# Patient Record
Sex: MALE | Race: WHITE | NOT HISPANIC OR LATINO | URBAN - METROPOLITAN AREA
[De-identification: names, ages, dates, MRNs, and addresses within clinical notes are randomized per-mention and may not be internally consistent; named-entity substitution may affect disease eponyms.]

---

## 2018-01-12 ENCOUNTER — OUTPATIENT (OUTPATIENT)
Dept: OUTPATIENT SERVICES | Facility: HOSPITAL | Age: 73
LOS: 1 days | Discharge: HOME | End: 2018-01-12

## 2018-01-12 DIAGNOSIS — D47.2 MONOCLONAL GAMMOPATHY: ICD-10-CM

## 2018-01-17 ENCOUNTER — RESULT REVIEW (OUTPATIENT)
Age: 73
End: 2018-01-17

## 2018-01-17 ENCOUNTER — OUTPATIENT (OUTPATIENT)
Dept: OUTPATIENT SERVICES | Facility: HOSPITAL | Age: 73
LOS: 1 days | End: 2018-01-17
Payer: MEDICARE

## 2018-01-17 ENCOUNTER — APPOINTMENT (OUTPATIENT)
Dept: HEMATOLOGY ONCOLOGY | Facility: CLINIC | Age: 73
End: 2018-01-17
Payer: MEDICARE

## 2018-01-17 VITALS
RESPIRATION RATE: 16 BRPM | TEMPERATURE: 98.2 F | BODY MASS INDEX: 24.02 KG/M2 | HEART RATE: 62 BPM | OXYGEN SATURATION: 98 % | DIASTOLIC BLOOD PRESSURE: 80 MMHG | HEIGHT: 69.37 IN | WEIGHT: 164 LBS | SYSTOLIC BLOOD PRESSURE: 122 MMHG

## 2018-01-17 DIAGNOSIS — D47.2 MONOCLONAL GAMMOPATHY: ICD-10-CM

## 2018-01-17 LAB
BASOPHILS # BLD AUTO: 0 K/UL — SIGNIFICANT CHANGE UP (ref 0–0.2)
BASOPHILS NFR BLD AUTO: 0.6 % — SIGNIFICANT CHANGE UP (ref 0–2)
BLD GP AB SCN SERPL QL: NEGATIVE — SIGNIFICANT CHANGE UP
DAT POLY-SP REAG RBC QL: NEGATIVE — SIGNIFICANT CHANGE UP
EOSINOPHIL # BLD AUTO: 0.3 K/UL — SIGNIFICANT CHANGE UP (ref 0–0.5)
EOSINOPHIL NFR BLD AUTO: 3.4 % — SIGNIFICANT CHANGE UP (ref 0–6)
ERYTHROCYTE [SEDIMENTATION RATE] IN BLOOD: 20 MM/HR — SIGNIFICANT CHANGE UP (ref 0–20)
HCT VFR BLD CALC: 36 % — LOW (ref 39–50)
HGB BLD-MCNC: 12.6 G/DL — LOW (ref 13–17)
LYMPHOCYTES # BLD AUTO: 1.4 K/UL — SIGNIFICANT CHANGE UP (ref 1–3.3)
LYMPHOCYTES # BLD AUTO: 17.8 % — SIGNIFICANT CHANGE UP (ref 13–44)
MCHC RBC-ENTMCNC: 33 PG — SIGNIFICANT CHANGE UP (ref 27–34)
MCHC RBC-ENTMCNC: 35.1 G/DL — SIGNIFICANT CHANGE UP (ref 32–36)
MCV RBC AUTO: 94.2 FL — SIGNIFICANT CHANGE UP (ref 80–100)
MONOCYTES # BLD AUTO: 0.7 K/UL — SIGNIFICANT CHANGE UP (ref 0–0.9)
MONOCYTES NFR BLD AUTO: 8.3 % — SIGNIFICANT CHANGE UP (ref 2–14)
NEUTROPHILS # BLD AUTO: 5.6 K/UL — SIGNIFICANT CHANGE UP (ref 1.8–7.4)
NEUTROPHILS NFR BLD AUTO: 70 % — SIGNIFICANT CHANGE UP (ref 43–77)
PLATELET # BLD AUTO: 267 K/UL — SIGNIFICANT CHANGE UP (ref 150–400)
RBC # BLD: 3.82 M/UL — LOW (ref 4.2–5.8)
RBC # FLD: 11.2 % — SIGNIFICANT CHANGE UP (ref 10.3–14.5)
RETICS #: 45.4 K/UL — SIGNIFICANT CHANGE UP (ref 25–125)
RETICS/RBC NFR: 1.2 % — SIGNIFICANT CHANGE UP (ref 0.5–2.5)
RH IG SCN BLD-IMP: POSITIVE — SIGNIFICANT CHANGE UP
WBC # BLD: 8 K/UL — SIGNIFICANT CHANGE UP (ref 3.8–10.5)
WBC # FLD AUTO: 8 K/UL — SIGNIFICANT CHANGE UP (ref 3.8–10.5)

## 2018-01-17 PROCEDURE — 86850 RBC ANTIBODY SCREEN: CPT

## 2018-01-17 PROCEDURE — 86900 BLOOD TYPING SEROLOGIC ABO: CPT

## 2018-01-17 PROCEDURE — 99205 OFFICE O/P NEW HI 60 MIN: CPT

## 2018-01-17 PROCEDURE — 86880 COOMBS TEST DIRECT: CPT

## 2018-01-17 PROCEDURE — 86901 BLOOD TYPING SEROLOGIC RH(D): CPT

## 2018-01-17 RX ORDER — UBIDECARENONE/VIT E ACET 100MG-5
50 MCG CAPSULE ORAL
Refills: 0 | Status: ACTIVE | COMMUNITY
Start: 2018-01-17

## 2018-01-31 LAB
ALBUMIN SERPL ELPH-MCNC: 4.8 G/DL
ALP BLD-CCNC: 82 U/L
ALT SERPL-CCNC: 18 U/L
ANION GAP SERPL CALC-SCNC: 18 MMOL/L
AST SERPL-CCNC: 21 U/L
BILIRUB SERPL-MCNC: 1.7 MG/DL
BUN SERPL-MCNC: 26 MG/DL
CALCIUM SERPL-MCNC: 10.5 MG/DL
CHLORIDE SERPL-SCNC: 100 MMOL/L
CO2 SERPL-SCNC: 22 MMOL/L
CREAT SERPL-MCNC: 1.22 MG/DL
CRP SERPL-MCNC: <0.2 MG/DL
DEPRECATED KAPPA LC FREE/LAMBDA SER: 1.2 RATIO
DEPRECATED KAPPA LC FREE/LAMBDA SER: 1.2 RATIO
EPO SERPL-MCNC: 8.3 MIU/ML
FERRITIN SERPL-MCNC: 637 NG/ML
GLUCOSE SERPL-MCNC: 94 MG/DL
HAPTOGLOB SERPL-MCNC: 190 MG/DL
HAV IGM SER QL: NONREACTIVE
HBV CORE IGM SER QL: NONREACTIVE
HBV SURFACE AG SER QL: NONREACTIVE
HCV AB SER QL: NONREACTIVE
HCV S/CO RATIO: 0.11 S/CO
IGA SER QL IEP: 177 MG/DL
IGG SER QL IEP: 871 MG/DL
IGM SER QL IEP: 42 MG/DL
IRON SATN MFR SERPL: 26 %
IRON SERPL-MCNC: 80 UG/DL
KAPPA LC CSF-MCNC: 1.53 MG/DL
KAPPA LC CSF-MCNC: 1.53 MG/DL
KAPPA LC SERPL-MCNC: 1.84 MG/DL
KAPPA LC SERPL-MCNC: 1.84 MG/DL
LDH SERPL-CCNC: 168 U/L
M PROTEIN SPEC IFE-MCNC: NORMAL
POTASSIUM SERPL-SCNC: 4.1 MMOL/L
PROT SERPL-MCNC: 7.1 G/DL
SODIUM SERPL-SCNC: 140 MMOL/L
T3FREE SERPL-MCNC: 2.74 PG/ML
T4 FREE SERPL-MCNC: 1.1 NG/DL
TESTOST SERPL-MCNC: 340.5 NG/DL
TIBC SERPL-MCNC: 304 UG/DL
TSH SERPL-ACNC: 3.21 UIU/ML
UIBC SERPL-MCNC: 224 UG/DL

## 2018-02-01 DIAGNOSIS — D64.9 ANEMIA, UNSPECIFIED: ICD-10-CM

## 2018-03-13 ENCOUNTER — OUTPATIENT (OUTPATIENT)
Dept: OUTPATIENT SERVICES | Facility: HOSPITAL | Age: 73
LOS: 1 days | Discharge: HOME | End: 2018-03-13

## 2018-03-13 DIAGNOSIS — D64.9 ANEMIA, UNSPECIFIED: ICD-10-CM

## 2018-03-15 ENCOUNTER — RESULT REVIEW (OUTPATIENT)
Age: 73
End: 2018-03-15

## 2018-03-15 ENCOUNTER — APPOINTMENT (OUTPATIENT)
Dept: HEMATOLOGY ONCOLOGY | Facility: CLINIC | Age: 73
End: 2018-03-15
Payer: MEDICARE

## 2018-03-15 VITALS
SYSTOLIC BLOOD PRESSURE: 157 MMHG | WEIGHT: 164.02 LBS | HEART RATE: 64 BPM | RESPIRATION RATE: 16 BRPM | OXYGEN SATURATION: 98 % | BODY MASS INDEX: 23.96 KG/M2 | DIASTOLIC BLOOD PRESSURE: 71 MMHG | TEMPERATURE: 97.9 F

## 2018-03-15 LAB
BASOPHILS # BLD AUTO: 0 K/UL — SIGNIFICANT CHANGE UP (ref 0–0.2)
BASOPHILS NFR BLD AUTO: 0.5 % — SIGNIFICANT CHANGE UP (ref 0–2)
EOSINOPHIL # BLD AUTO: 0.1 K/UL — SIGNIFICANT CHANGE UP (ref 0–0.5)
EOSINOPHIL NFR BLD AUTO: 1.9 % — SIGNIFICANT CHANGE UP (ref 0–6)
HCT VFR BLD CALC: 35.2 % — LOW (ref 39–50)
HGB BLD-MCNC: 12.1 G/DL — LOW (ref 13–17)
LYMPHOCYTES # BLD AUTO: 1.6 K/UL — SIGNIFICANT CHANGE UP (ref 1–3.3)
LYMPHOCYTES # BLD AUTO: 19.9 % — SIGNIFICANT CHANGE UP (ref 13–44)
MCHC RBC-ENTMCNC: 32 PG — SIGNIFICANT CHANGE UP (ref 27–34)
MCHC RBC-ENTMCNC: 34.4 G/DL — SIGNIFICANT CHANGE UP (ref 32–36)
MCV RBC AUTO: 93.1 FL — SIGNIFICANT CHANGE UP (ref 80–100)
MONOCYTES # BLD AUTO: 0.7 K/UL — SIGNIFICANT CHANGE UP (ref 0–0.9)
MONOCYTES NFR BLD AUTO: 8.6 % — SIGNIFICANT CHANGE UP (ref 2–14)
NEUTROPHILS # BLD AUTO: 5.4 K/UL — SIGNIFICANT CHANGE UP (ref 1.8–7.4)
NEUTROPHILS NFR BLD AUTO: 69.1 % — SIGNIFICANT CHANGE UP (ref 43–77)
PLATELET # BLD AUTO: 231 K/UL — SIGNIFICANT CHANGE UP (ref 150–400)
RBC # BLD: 3.78 M/UL — LOW (ref 4.2–5.8)
RBC # FLD: 11.1 % — SIGNIFICANT CHANGE UP (ref 10.3–14.5)
RETICS #: 40.6 K/UL — SIGNIFICANT CHANGE UP (ref 25–125)
RETICS/RBC NFR: 1.1 % — SIGNIFICANT CHANGE UP (ref 0.5–2.5)
WBC # BLD: 7.8 K/UL — SIGNIFICANT CHANGE UP (ref 3.8–10.5)
WBC # FLD AUTO: 7.8 K/UL — SIGNIFICANT CHANGE UP (ref 3.8–10.5)

## 2018-03-15 PROCEDURE — 99214 OFFICE O/P EST MOD 30 MIN: CPT

## 2018-03-16 LAB
ALBUMIN SERPL ELPH-MCNC: 4.6 G/DL
ALP BLD-CCNC: 107 U/L
ALT SERPL-CCNC: 23 U/L
ANION GAP SERPL CALC-SCNC: 13 MMOL/L
AST SERPL-CCNC: 25 U/L
BILIRUB SERPL-MCNC: 2 MG/DL
BUN SERPL-MCNC: 20 MG/DL
CALCIUM SERPL-MCNC: 9.7 MG/DL
CHLORIDE SERPL-SCNC: 96 MMOL/L
CO2 SERPL-SCNC: 26 MMOL/L
CREAT SERPL-MCNC: 1.21 MG/DL
FERRITIN SERPL-MCNC: 563 NG/ML
FOLATE SERPL-MCNC: 13 NG/ML
GLUCOSE SERPL-MCNC: 97 MG/DL
IRON SATN MFR SERPL: 17 %
IRON SERPL-MCNC: 52 UG/DL
LDH SERPL-CCNC: 173 U/L
POTASSIUM SERPL-SCNC: 4.4 MMOL/L
PROT SERPL-MCNC: 7.2 G/DL
SODIUM SERPL-SCNC: 135 MMOL/L
TIBC SERPL-MCNC: 300 UG/DL
UIBC SERPL-MCNC: 248 UG/DL
VIT B12 SERPL-MCNC: 530 PG/ML

## 2018-03-27 PROBLEM — E80.6 HYPERBILIRUBINEMIA: Status: ACTIVE | Noted: 2018-03-27

## 2018-03-28 ENCOUNTER — FORM ENCOUNTER (OUTPATIENT)
Age: 73
End: 2018-03-28

## 2018-03-29 ENCOUNTER — APPOINTMENT (OUTPATIENT)
Dept: MRI IMAGING | Facility: IMAGING CENTER | Age: 73
End: 2018-03-29
Payer: MEDICARE

## 2018-03-29 ENCOUNTER — APPOINTMENT (OUTPATIENT)
Dept: HEMATOLOGY ONCOLOGY | Facility: CLINIC | Age: 73
End: 2018-03-29

## 2018-03-29 ENCOUNTER — OUTPATIENT (OUTPATIENT)
Dept: OUTPATIENT SERVICES | Facility: HOSPITAL | Age: 73
LOS: 1 days | End: 2018-03-29
Payer: MEDICARE

## 2018-03-29 ENCOUNTER — RESULT REVIEW (OUTPATIENT)
Age: 73
End: 2018-03-29

## 2018-03-29 ENCOUNTER — LABORATORY RESULT (OUTPATIENT)
Age: 73
End: 2018-03-29

## 2018-03-29 ENCOUNTER — TRANSCRIPTION ENCOUNTER (OUTPATIENT)
Age: 73
End: 2018-03-29

## 2018-03-29 DIAGNOSIS — E80.6 OTHER DISORDERS OF BILIRUBIN METABOLISM: ICD-10-CM

## 2018-03-29 DIAGNOSIS — R79.89 OTHER SPECIFIED ABNORMAL FINDINGS OF BLOOD CHEMISTRY: ICD-10-CM

## 2018-03-29 LAB
BASOPHILS # BLD AUTO: 0.1 K/UL — SIGNIFICANT CHANGE UP (ref 0–0.2)
BASOPHILS NFR BLD AUTO: 0.8 % — SIGNIFICANT CHANGE UP (ref 0–2)
EOSINOPHIL # BLD AUTO: 0.2 K/UL — SIGNIFICANT CHANGE UP (ref 0–0.5)
EOSINOPHIL NFR BLD AUTO: 3 % — SIGNIFICANT CHANGE UP (ref 0–6)
HCT VFR BLD CALC: 35.8 % — LOW (ref 39–50)
HGB BLD-MCNC: 12.1 G/DL — LOW (ref 13–17)
LYMPHOCYTES # BLD AUTO: 1 K/UL — SIGNIFICANT CHANGE UP (ref 1–3.3)
LYMPHOCYTES # BLD AUTO: 14.6 % — SIGNIFICANT CHANGE UP (ref 13–44)
MCHC RBC-ENTMCNC: 31.6 PG — SIGNIFICANT CHANGE UP (ref 27–34)
MCHC RBC-ENTMCNC: 33.9 G/DL — SIGNIFICANT CHANGE UP (ref 32–36)
MCV RBC AUTO: 93.4 FL — SIGNIFICANT CHANGE UP (ref 80–100)
MONOCYTES # BLD AUTO: 0.4 K/UL — SIGNIFICANT CHANGE UP (ref 0–0.9)
MONOCYTES NFR BLD AUTO: 5.4 % — SIGNIFICANT CHANGE UP (ref 2–14)
NEUTROPHILS # BLD AUTO: 5.2 K/UL — SIGNIFICANT CHANGE UP (ref 1.8–7.4)
NEUTROPHILS NFR BLD AUTO: 76.2 % — SIGNIFICANT CHANGE UP (ref 43–77)
PLATELET # BLD AUTO: 247 K/UL — SIGNIFICANT CHANGE UP (ref 150–400)
RBC # BLD: 3.83 M/UL — LOW (ref 4.2–5.8)
RBC # FLD: 11.3 % — SIGNIFICANT CHANGE UP (ref 10.3–14.5)
WBC # BLD: 6.8 K/UL — SIGNIFICANT CHANGE UP (ref 3.8–10.5)
WBC # FLD AUTO: 6.8 K/UL — SIGNIFICANT CHANGE UP (ref 3.8–10.5)

## 2018-03-29 PROCEDURE — 74183 MRI ABD W/O CNTR FLWD CNTR: CPT | Mod: 26

## 2018-03-29 PROCEDURE — 72197 MRI PELVIS W/O & W/DYE: CPT | Mod: 26

## 2018-03-29 PROCEDURE — 72197 MRI PELVIS W/O & W/DYE: CPT

## 2018-03-29 PROCEDURE — A9585: CPT

## 2018-03-29 PROCEDURE — 74183 MRI ABD W/O CNTR FLWD CNTR: CPT

## 2018-06-26 ENCOUNTER — OUTPATIENT (OUTPATIENT)
Dept: OUTPATIENT SERVICES | Facility: HOSPITAL | Age: 73
LOS: 1 days | Discharge: HOME | End: 2018-06-26

## 2018-06-26 DIAGNOSIS — D64.9 ANEMIA, UNSPECIFIED: ICD-10-CM

## 2018-06-28 ENCOUNTER — RESULT REVIEW (OUTPATIENT)
Age: 73
End: 2018-06-28

## 2018-06-28 ENCOUNTER — OUTPATIENT (OUTPATIENT)
Dept: OUTPATIENT SERVICES | Facility: HOSPITAL | Age: 73
LOS: 1 days | End: 2018-06-28

## 2018-06-28 ENCOUNTER — APPOINTMENT (OUTPATIENT)
Dept: HEMATOLOGY ONCOLOGY | Facility: CLINIC | Age: 73
End: 2018-06-28
Payer: MEDICARE

## 2018-06-28 VITALS
HEART RATE: 60 BPM | DIASTOLIC BLOOD PRESSURE: 71 MMHG | OXYGEN SATURATION: 100 % | BODY MASS INDEX: 23.77 KG/M2 | RESPIRATION RATE: 16 BRPM | SYSTOLIC BLOOD PRESSURE: 153 MMHG | TEMPERATURE: 98.6 F | WEIGHT: 162.68 LBS

## 2018-06-28 DIAGNOSIS — D64.9 ANEMIA, UNSPECIFIED: ICD-10-CM

## 2018-06-28 LAB
ALBUMIN SERPL ELPH-MCNC: 4.8 G/DL
ALP BLD-CCNC: 70 U/L
ALT SERPL-CCNC: 24 U/L
ANION GAP SERPL CALC-SCNC: 15 MMOL/L
AST SERPL-CCNC: 25 U/L
BASOPHILS # BLD AUTO: 0.1 K/UL — SIGNIFICANT CHANGE UP (ref 0–0.2)
BASOPHILS NFR BLD AUTO: 0.7 % — SIGNIFICANT CHANGE UP (ref 0–2)
BILIRUB SERPL-MCNC: 1.6 MG/DL
BUN SERPL-MCNC: 24 MG/DL
CALCIUM SERPL-MCNC: 10.1 MG/DL
CHLORIDE SERPL-SCNC: 102 MMOL/L
CO2 SERPL-SCNC: 24 MMOL/L
CREAT SERPL-MCNC: 1.25 MG/DL
EOSINOPHIL # BLD AUTO: 0.1 K/UL — SIGNIFICANT CHANGE UP (ref 0–0.5)
EOSINOPHIL NFR BLD AUTO: 1.8 % — SIGNIFICANT CHANGE UP (ref 0–6)
GLUCOSE SERPL-MCNC: 86 MG/DL
HCT VFR BLD CALC: 35.8 % — LOW (ref 39–50)
HGB BLD-MCNC: 12.3 G/DL — LOW (ref 13–17)
IRON SATN MFR SERPL: 27 %
IRON SERPL-MCNC: 81 UG/DL
LDH SERPL-CCNC: 154 U/L
LYMPHOCYTES # BLD AUTO: 1.4 K/UL — SIGNIFICANT CHANGE UP (ref 1–3.3)
LYMPHOCYTES # BLD AUTO: 18.9 % — SIGNIFICANT CHANGE UP (ref 13–44)
MCHC RBC-ENTMCNC: 31.9 PG — SIGNIFICANT CHANGE UP (ref 27–34)
MCHC RBC-ENTMCNC: 34.3 G/DL — SIGNIFICANT CHANGE UP (ref 32–36)
MCV RBC AUTO: 93.1 FL — SIGNIFICANT CHANGE UP (ref 80–100)
MONOCYTES # BLD AUTO: 0.4 K/UL — SIGNIFICANT CHANGE UP (ref 0–0.9)
MONOCYTES NFR BLD AUTO: 6.1 % — SIGNIFICANT CHANGE UP (ref 2–14)
NEUTROPHILS # BLD AUTO: 5.3 K/UL — SIGNIFICANT CHANGE UP (ref 1.8–7.4)
NEUTROPHILS NFR BLD AUTO: 72.6 % — SIGNIFICANT CHANGE UP (ref 43–77)
PLATELET # BLD AUTO: 244 K/UL — SIGNIFICANT CHANGE UP (ref 150–400)
POTASSIUM SERPL-SCNC: 3.9 MMOL/L
PROT SERPL-MCNC: 7.2 G/DL
RBC # BLD: 3.85 M/UL — LOW (ref 4.2–5.8)
RBC # FLD: 11.6 % — SIGNIFICANT CHANGE UP (ref 10.3–14.5)
SODIUM SERPL-SCNC: 141 MMOL/L
TIBC SERPL-MCNC: 301 UG/DL
UIBC SERPL-MCNC: 220 UG/DL
WBC # BLD: 7.3 K/UL — SIGNIFICANT CHANGE UP (ref 3.8–10.5)
WBC # FLD AUTO: 7.3 K/UL — SIGNIFICANT CHANGE UP (ref 3.8–10.5)

## 2018-06-28 PROCEDURE — 99215 OFFICE O/P EST HI 40 MIN: CPT

## 2018-07-02 LAB
FERRITIN SERPL-MCNC: 495 NG/ML
FOLATE SERPL-MCNC: 10.2 NG/ML
VIT B12 SERPL-MCNC: 402 PG/ML

## 2018-09-14 ENCOUNTER — OUTPATIENT (OUTPATIENT)
Dept: OUTPATIENT SERVICES | Facility: HOSPITAL | Age: 73
LOS: 1 days | Discharge: HOME | End: 2018-09-14

## 2018-09-14 DIAGNOSIS — D64.9 ANEMIA, UNSPECIFIED: ICD-10-CM

## 2018-09-20 ENCOUNTER — RESULT REVIEW (OUTPATIENT)
Age: 73
End: 2018-09-20

## 2018-09-20 ENCOUNTER — APPOINTMENT (OUTPATIENT)
Dept: HEMATOLOGY ONCOLOGY | Facility: CLINIC | Age: 73
End: 2018-09-20
Payer: MEDICARE

## 2018-09-20 VITALS
TEMPERATURE: 98.3 F | BODY MASS INDEX: 24.09 KG/M2 | RESPIRATION RATE: 16 BRPM | OXYGEN SATURATION: 100 % | SYSTOLIC BLOOD PRESSURE: 137 MMHG | HEART RATE: 57 BPM | DIASTOLIC BLOOD PRESSURE: 68 MMHG | WEIGHT: 164.89 LBS

## 2018-09-20 LAB
BASOPHILS # BLD AUTO: 0.1 K/UL — SIGNIFICANT CHANGE UP (ref 0–0.2)
BASOPHILS NFR BLD AUTO: 0.9 % — SIGNIFICANT CHANGE UP (ref 0–2)
EOSINOPHIL # BLD AUTO: 0.2 K/UL — SIGNIFICANT CHANGE UP (ref 0–0.5)
EOSINOPHIL NFR BLD AUTO: 2.6 % — SIGNIFICANT CHANGE UP (ref 0–6)
HCT VFR BLD CALC: 34.9 % — LOW (ref 39–50)
HGB BLD-MCNC: 11.9 G/DL — LOW (ref 13–17)
LYMPHOCYTES # BLD AUTO: 1.2 K/UL — SIGNIFICANT CHANGE UP (ref 1–3.3)
LYMPHOCYTES # BLD AUTO: 18.9 % — SIGNIFICANT CHANGE UP (ref 13–44)
MCHC RBC-ENTMCNC: 31.4 PG — SIGNIFICANT CHANGE UP (ref 27–34)
MCHC RBC-ENTMCNC: 33.9 G/DL — SIGNIFICANT CHANGE UP (ref 32–36)
MCV RBC AUTO: 92.7 FL — SIGNIFICANT CHANGE UP (ref 80–100)
MONOCYTES # BLD AUTO: 0.5 K/UL — SIGNIFICANT CHANGE UP (ref 0–0.9)
MONOCYTES NFR BLD AUTO: 7 % — SIGNIFICANT CHANGE UP (ref 2–14)
NEUTROPHILS # BLD AUTO: 4.6 K/UL — SIGNIFICANT CHANGE UP (ref 1.8–7.4)
NEUTROPHILS NFR BLD AUTO: 70.5 % — SIGNIFICANT CHANGE UP (ref 43–77)
PLATELET # BLD AUTO: 224 K/UL — SIGNIFICANT CHANGE UP (ref 150–400)
RBC # BLD: 3.77 M/UL — LOW (ref 4.2–5.8)
RBC # FLD: 11.4 % — SIGNIFICANT CHANGE UP (ref 10.3–14.5)
WBC # BLD: 6.5 K/UL — SIGNIFICANT CHANGE UP (ref 3.8–10.5)
WBC # FLD AUTO: 6.5 K/UL — SIGNIFICANT CHANGE UP (ref 3.8–10.5)

## 2018-09-20 PROCEDURE — 99214 OFFICE O/P EST MOD 30 MIN: CPT

## 2018-09-26 LAB
ALBUMIN SERPL ELPH-MCNC: 4.8 G/DL
ALP BLD-CCNC: 70 U/L
ALT SERPL-CCNC: 18 U/L
ANION GAP SERPL CALC-SCNC: 11 MMOL/L
AST SERPL-CCNC: 23 U/L
BILIRUB SERPL-MCNC: 1.4 MG/DL
BUN SERPL-MCNC: 18 MG/DL
CALCIUM SERPL-MCNC: 10 MG/DL
CHLORIDE SERPL-SCNC: 101 MMOL/L
CO2 SERPL-SCNC: 25 MMOL/L
CREAT SERPL-MCNC: 1.17 MG/DL
FERRITIN SERPL-MCNC: 441 NG/ML
GLUCOSE SERPL-MCNC: 88 MG/DL
LDH SERPL-CCNC: 166 U/L
POTASSIUM SERPL-SCNC: 4.4 MMOL/L
PROT SERPL-MCNC: 6.7 G/DL
SODIUM SERPL-SCNC: 137 MMOL/L

## 2018-10-31 ENCOUNTER — RECORD ABSTRACTING (OUTPATIENT)
Age: 73
End: 2018-10-31

## 2018-10-31 DIAGNOSIS — R97.20 ELEVATED PROSTATE, SPECIFIC ANTIGEN [PSA]: ICD-10-CM

## 2018-10-31 DIAGNOSIS — I77.810 THORACIC AORTIC ECTASIA: ICD-10-CM

## 2018-10-31 DIAGNOSIS — Z80.3 FAMILY HISTORY OF MALIGNANT NEOPLASM OF BREAST: ICD-10-CM

## 2018-10-31 DIAGNOSIS — Z87.891 PERSONAL HISTORY OF NICOTINE DEPENDENCE: ICD-10-CM

## 2018-11-18 ENCOUNTER — APPOINTMENT (OUTPATIENT)
Dept: INTERNAL MEDICINE | Facility: CLINIC | Age: 73
End: 2018-11-18

## 2018-12-19 ENCOUNTER — OUTPATIENT (OUTPATIENT)
Dept: OUTPATIENT SERVICES | Facility: HOSPITAL | Age: 73
LOS: 1 days | Discharge: HOME | End: 2018-12-19

## 2018-12-19 DIAGNOSIS — D64.9 ANEMIA, UNSPECIFIED: ICD-10-CM

## 2019-01-03 ENCOUNTER — APPOINTMENT (OUTPATIENT)
Dept: HEMATOLOGY ONCOLOGY | Facility: CLINIC | Age: 74
End: 2019-01-03
Payer: MEDICARE

## 2019-01-03 ENCOUNTER — RESULT REVIEW (OUTPATIENT)
Age: 74
End: 2019-01-03

## 2019-01-03 VITALS
SYSTOLIC BLOOD PRESSURE: 149 MMHG | RESPIRATION RATE: 17 BRPM | HEART RATE: 5 BPM | DIASTOLIC BLOOD PRESSURE: 69 MMHG | BODY MASS INDEX: 24.35 KG/M2 | TEMPERATURE: 98.1 F | WEIGHT: 166.67 LBS | OXYGEN SATURATION: 100 %

## 2019-01-03 LAB
BASOPHILS # BLD AUTO: 0 K/UL — SIGNIFICANT CHANGE UP (ref 0–0.2)
BASOPHILS NFR BLD AUTO: 0.5 % — SIGNIFICANT CHANGE UP (ref 0–2)
EOSINOPHIL # BLD AUTO: 0.3 K/UL — SIGNIFICANT CHANGE UP (ref 0–0.5)
EOSINOPHIL NFR BLD AUTO: 3.2 % — SIGNIFICANT CHANGE UP (ref 0–6)
HCT VFR BLD CALC: 36.6 % — LOW (ref 39–50)
HGB BLD-MCNC: 13.1 G/DL — SIGNIFICANT CHANGE UP (ref 13–17)
LYMPHOCYTES # BLD AUTO: 1.8 K/UL — SIGNIFICANT CHANGE UP (ref 1–3.3)
LYMPHOCYTES # BLD AUTO: 19.3 % — SIGNIFICANT CHANGE UP (ref 13–44)
MCHC RBC-ENTMCNC: 32.7 PG — SIGNIFICANT CHANGE UP (ref 27–34)
MCHC RBC-ENTMCNC: 35.7 G/DL — SIGNIFICANT CHANGE UP (ref 32–36)
MCV RBC AUTO: 91.5 FL — SIGNIFICANT CHANGE UP (ref 80–100)
MONOCYTES # BLD AUTO: 0.7 K/UL — SIGNIFICANT CHANGE UP (ref 0–0.9)
MONOCYTES NFR BLD AUTO: 7.3 % — SIGNIFICANT CHANGE UP (ref 2–14)
NEUTROPHILS # BLD AUTO: 6.5 K/UL — SIGNIFICANT CHANGE UP (ref 1.8–7.4)
NEUTROPHILS NFR BLD AUTO: 69.7 % — SIGNIFICANT CHANGE UP (ref 43–77)
PLATELET # BLD AUTO: 265 K/UL — SIGNIFICANT CHANGE UP (ref 150–400)
RBC # BLD: 4 M/UL — LOW (ref 4.2–5.8)
RBC # FLD: 11.6 % — SIGNIFICANT CHANGE UP (ref 10.3–14.5)
WBC # BLD: 9.3 K/UL — SIGNIFICANT CHANGE UP (ref 3.8–10.5)
WBC # FLD AUTO: 9.3 K/UL — SIGNIFICANT CHANGE UP (ref 3.8–10.5)

## 2019-01-03 PROCEDURE — 99213 OFFICE O/P EST LOW 20 MIN: CPT

## 2019-01-03 RX ORDER — VALSARTAN 160 MG/1
160 TABLET, COATED ORAL DAILY
Refills: 0 | Status: DISCONTINUED | COMMUNITY
Start: 2017-12-24 | End: 2019-01-03

## 2019-01-04 LAB
ALBUMIN SERPL ELPH-MCNC: 5.2 G/DL
ALP BLD-CCNC: 82 U/L
ALT SERPL-CCNC: 21 U/L
ANION GAP SERPL CALC-SCNC: 13 MMOL/L
AST SERPL-CCNC: 23 U/L
BILIRUB SERPL-MCNC: 2 MG/DL
BUN SERPL-MCNC: 17 MG/DL
CALCIUM SERPL-MCNC: 10.5 MG/DL
CHLORIDE SERPL-SCNC: 102 MMOL/L
CO2 SERPL-SCNC: 25 MMOL/L
CREAT SERPL-MCNC: 1.12 MG/DL
FERRITIN SERPL-MCNC: 382 NG/ML
GLUCOSE SERPL-MCNC: 91 MG/DL
LDH SERPL-CCNC: 194 U/L
POTASSIUM SERPL-SCNC: 4.4 MMOL/L
PROT SERPL-MCNC: 7.2 G/DL
SODIUM SERPL-SCNC: 140 MMOL/L

## 2019-01-05 NOTE — RESULTS/DATA
[FreeTextEntry1] : CBC reveals a white count of 9.3 hemoglobin 13.1 MCV 91.5 RDW 11.6 and platelet count 265,000.\par Comprehensive metabolic panel reveals a bilirubin of 2.0 from the patient's Gilbert Syndrome i ferritin is 382.\par

## 2019-01-05 NOTE — HISTORY OF PRESENT ILLNESS
[de-identified] : \par He has stopped drinking for a month.\par He denies fatigue dyspnea on exertion

## 2019-01-05 NOTE — ASSESSMENT
[FreeTextEntry1] : Mild anemia has resolved\par Hyperferritinemia has resolved, likely due to discontinuation of alcohol intake\par Return to the office in 4-6 months

## 2019-01-05 NOTE — REASON FOR VISIT
[Follow-Up Visit] : a follow-up visit for [FreeTextEntry2] : Hyperferritinemia, mild anemia, Gilbert's syndrome, alcohol

## 2019-01-13 ENCOUNTER — APPOINTMENT (OUTPATIENT)
Dept: INTERNAL MEDICINE | Facility: CLINIC | Age: 74
End: 2019-01-13

## 2019-01-16 ENCOUNTER — RX RENEWAL (OUTPATIENT)
Age: 74
End: 2019-01-16

## 2019-02-17 ENCOUNTER — APPOINTMENT (OUTPATIENT)
Dept: INTERNAL MEDICINE | Facility: CLINIC | Age: 74
End: 2019-02-17
Payer: MEDICARE

## 2019-02-17 ENCOUNTER — NON-APPOINTMENT (OUTPATIENT)
Age: 74
End: 2019-02-17

## 2019-02-17 VITALS
BODY MASS INDEX: 23.81 KG/M2 | WEIGHT: 163 LBS | DIASTOLIC BLOOD PRESSURE: 72 MMHG | HEART RATE: 60 BPM | SYSTOLIC BLOOD PRESSURE: 151 MMHG

## 2019-02-17 VITALS — SYSTOLIC BLOOD PRESSURE: 150 MMHG | DIASTOLIC BLOOD PRESSURE: 80 MMHG

## 2019-02-17 DIAGNOSIS — Z00.00 ENCOUNTER FOR GENERAL ADULT MEDICAL EXAMINATION W/OUT ABNORMAL FINDINGS: ICD-10-CM

## 2019-02-17 PROCEDURE — 93000 ELECTROCARDIOGRAM COMPLETE: CPT

## 2019-02-17 PROCEDURE — G0439: CPT

## 2019-02-17 PROCEDURE — 36415 COLL VENOUS BLD VENIPUNCTURE: CPT

## 2019-02-17 PROCEDURE — 94010 BREATHING CAPACITY TEST: CPT

## 2019-02-17 RX ORDER — TELMISARTAN 40 MG/1
40 TABLET ORAL DAILY
Refills: 0 | Status: DISCONTINUED | COMMUNITY
End: 2019-02-17

## 2019-02-17 NOTE — HEALTH RISK ASSESSMENT
[Good] : ~his/her~  mood as  good [No falls in past year] : Patient reported no falls in the past year [0] : 2) Feeling down, depressed, or hopeless: Not at all (0) [None] : None [] :  [Fully functional (bathing, dressing, toileting, transferring, walking, feeding)] : Fully functional (bathing, dressing, toileting, transferring, walking, feeding) [Fully functional (using the telephone, shopping, preparing meals, housekeeping, doing laundry, using] : Fully functional and needs no help or supervision to perform IADLs (using the telephone, shopping, preparing meals, housekeeping, doing laundry, using transportation, managing medications and managing finances) [] : No [Change in mental status noted] : No change in mental status noted [Language] : denies difficulty with language [Behavior] : denies difficulty with behavior [Learning/Retaining New Information] : denies difficulty learning/retaining new information [Handling Complex Tasks] : denies difficulty handling complex tasks [Reasoning] : denies difficulty with reasoning [Spatial Ability and Orientation] : denies difficulty with spatial ability and orientation [Reports changes in hearing] : Reports no changes in hearing [Reports changes in vision] : Reports no changes in vision [Reports changes in dental health] : Reports no changes in dental health [ColonoscopyDate] : 2015

## 2019-02-17 NOTE — COUNSELING
[de-identified] : patient counseled on    diet   importance of exercise and not smoking  regular dental care and periodic eye exams.  timing of when will be due for colonoscopy   discussed\par \par \par \par

## 2019-02-18 LAB
ALBUMIN SERPL ELPH-MCNC: 4.8 G/DL
ALP BLD-CCNC: 75 U/L
ALT SERPL-CCNC: 20 U/L
ANION GAP SERPL CALC-SCNC: 12 MMOL/L
AST SERPL-CCNC: 23 U/L
BILIRUB SERPL-MCNC: 1.4 MG/DL
BUN SERPL-MCNC: 20 MG/DL
CALCIUM SERPL-MCNC: 10 MG/DL
CHLORIDE SERPL-SCNC: 103 MMOL/L
CHOLEST SERPL-MCNC: 177 MG/DL
CHOLEST/HDLC SERPL: 2.8 RATIO
CO2 SERPL-SCNC: 26 MMOL/L
CREAT SERPL-MCNC: 1.19 MG/DL
GLUCOSE SERPL-MCNC: 95 MG/DL
HDLC SERPL-MCNC: 63 MG/DL
LDLC SERPL CALC-MCNC: 98 MG/DL
POTASSIUM SERPL-SCNC: 4.9 MMOL/L
PROT SERPL-MCNC: 7 G/DL
PSA SERPL-MCNC: 0.35 NG/ML
SODIUM SERPL-SCNC: 141 MMOL/L
TRIGL SERPL-MCNC: 78 MG/DL

## 2019-02-25 LAB
BASOPHILS # BLD AUTO: 0.04 K/UL
BASOPHILS NFR BLD AUTO: 0.6 %
EOSINOPHIL # BLD AUTO: 0.2 K/UL
EOSINOPHIL NFR BLD AUTO: 2.9 %
HCT VFR BLD CALC: 36.9 %
HGB BLD-MCNC: 11.6 G/DL
IMM GRANULOCYTES NFR BLD AUTO: 0.3 %
LYMPHOCYTES # BLD AUTO: 1.11 K/UL
LYMPHOCYTES NFR BLD AUTO: 16.3 %
MAN DIFF?: NORMAL
MCHC RBC-ENTMCNC: 30.9 PG
MCHC RBC-ENTMCNC: 31.4 GM/DL
MCV RBC AUTO: 98.4 FL
MONOCYTES # BLD AUTO: 0.4 K/UL
MONOCYTES NFR BLD AUTO: 5.9 %
NEUTROPHILS # BLD AUTO: 5.03 K/UL
NEUTROPHILS NFR BLD AUTO: 74 %
PLATELET # BLD AUTO: 272 K/UL
RBC # BLD: 3.75 M/UL
RBC # FLD: 13.1 %
WBC # FLD AUTO: 6.8 K/UL

## 2019-02-26 LAB — HBA1C MFR BLD HPLC: 5.6 %

## 2019-02-27 ENCOUNTER — APPOINTMENT (OUTPATIENT)
Dept: INTERNAL MEDICINE | Facility: CLINIC | Age: 74
End: 2019-02-27
Payer: MEDICARE

## 2019-02-27 ENCOUNTER — RESULT CHARGE (OUTPATIENT)
Age: 74
End: 2019-02-27

## 2019-02-27 VITALS
HEART RATE: 67 BPM | HEIGHT: 69 IN | SYSTOLIC BLOOD PRESSURE: 171 MMHG | BODY MASS INDEX: 24.44 KG/M2 | DIASTOLIC BLOOD PRESSURE: 67 MMHG | WEIGHT: 165 LBS

## 2019-02-27 DIAGNOSIS — R94.39 ABNORMAL RESULT OF OTHER CARDIOVASCULAR FUNCTION STUDY: ICD-10-CM

## 2019-02-27 LAB
CARD LOT #: 1181
CARD LOT EXP DATE: NORMAL
DATE COLLECTED: NORMAL
DATE COLLECTED: NORMAL
DEVELOPER LOT #: NORMAL
DEVELOPER LOT EXP DATE: NORMAL
HEMOCCULT 2: NEGATIVE
HEMOCCULT 3: NEGATIVE
HEMOCCULT SP1 STL QL: NEGATIVE
QUALITY CONTROL: YES

## 2019-02-27 PROCEDURE — 99213 OFFICE O/P EST LOW 20 MIN: CPT | Mod: 25

## 2019-02-27 PROCEDURE — 82270 OCCULT BLOOD FECES: CPT | Mod: QW

## 2019-02-27 PROCEDURE — 93015 CV STRESS TEST SUPVJ I&R: CPT

## 2019-03-02 ENCOUNTER — TRANSCRIPTION ENCOUNTER (OUTPATIENT)
Age: 74
End: 2019-03-02

## 2019-03-24 ENCOUNTER — APPOINTMENT (OUTPATIENT)
Dept: INTERNAL MEDICINE | Facility: CLINIC | Age: 74
End: 2019-03-24
Payer: MEDICARE

## 2019-03-24 VITALS — RESPIRATION RATE: 11 BRPM | HEART RATE: 70 BPM

## 2019-03-24 VITALS — SYSTOLIC BLOOD PRESSURE: 128 MMHG | DIASTOLIC BLOOD PRESSURE: 78 MMHG | HEIGHT: 69 IN

## 2019-03-24 PROCEDURE — 99214 OFFICE O/P EST MOD 30 MIN: CPT

## 2019-03-24 RX ORDER — ASPIRIN 81 MG/1
81 TABLET ORAL DAILY
Refills: 0 | Status: DISCONTINUED | COMMUNITY
Start: 2018-01-17 | End: 2019-03-24

## 2019-03-24 NOTE — PLAN
[FreeTextEntry1] : diet discussed re predm  explained that benefits of asa at his age may be less than risk of bleeding   explained that bp as he ages may goup and will need ongoing monitoring

## 2019-03-24 NOTE — HISTORY OF PRESENT ILLNESS
[FreeTextEntry1] : for f/u of  htn predm  and hyperlipidemia   his telmesartan dose was inc to 80mg last visit and he feels ok on the dose  no ha  no dizziness  he has stable santiago no cp.  he had normal. nuc stress 3 yrs ago after ca seen in coronaries on chest ct   hwe is tolerating statin and  says he does not overdo the carbs last a1c was high normal

## 2019-03-24 NOTE — PHYSICAL EXAM
[No Acute Distress] : no acute distress [Well Nourished] : well nourished [Well Developed] : well developed [Normal Sclera/Conjunctiva] : normal sclera/conjunctiva [Normal Outer Ear/Nose] : the outer ears and nose were normal in appearance [No JVD] : no jugular venous distention [Supple] : supple [No Respiratory Distress] : no respiratory distress  [Clear to Auscultation] : lungs were clear to auscultation bilaterally [No Edema] : there was no peripheral edema [No Joint Swelling] : no joint swelling [Grossly Normal Strength/Tone] : grossly normal strength/tone [No Rash] : no rash [No Skin Lesions] : no skin lesions [Normal Gait] : normal gait [Coordination Grossly Intact] : coordination grossly intact [Speech Grossly Normal] : speech grossly normal [Normal Affect] : the affect was normal [Normal Mood] : the mood was normal [Normal Insight/Judgement] : insight and judgment were intact

## 2019-09-26 ENCOUNTER — APPOINTMENT (OUTPATIENT)
Dept: INTERNAL MEDICINE | Facility: CLINIC | Age: 74
End: 2019-09-26
Payer: MEDICARE

## 2019-09-26 PROCEDURE — G0008: CPT

## 2019-09-26 PROCEDURE — 90662 IIV NO PRSV INCREASED AG IM: CPT

## 2019-10-22 ENCOUNTER — OUTPATIENT (OUTPATIENT)
Dept: OUTPATIENT SERVICES | Facility: HOSPITAL | Age: 74
LOS: 1 days | Discharge: HOME | End: 2019-10-22

## 2019-10-22 DIAGNOSIS — D64.9 ANEMIA, UNSPECIFIED: ICD-10-CM

## 2019-11-01 ENCOUNTER — RESULT REVIEW (OUTPATIENT)
Age: 74
End: 2019-11-01

## 2019-11-01 ENCOUNTER — APPOINTMENT (OUTPATIENT)
Dept: HEMATOLOGY ONCOLOGY | Facility: CLINIC | Age: 74
End: 2019-11-01
Payer: MEDICARE

## 2019-11-01 VITALS
RESPIRATION RATE: 16 BRPM | SYSTOLIC BLOOD PRESSURE: 138 MMHG | OXYGEN SATURATION: 100 % | WEIGHT: 163.14 LBS | BODY MASS INDEX: 24.09 KG/M2 | DIASTOLIC BLOOD PRESSURE: 73 MMHG | HEART RATE: 56 BPM | TEMPERATURE: 97.5 F

## 2019-11-01 DIAGNOSIS — C61 MALIGNANT NEOPLASM OF PROSTATE: ICD-10-CM

## 2019-11-01 LAB
BASOPHILS # BLD AUTO: 0 K/UL — SIGNIFICANT CHANGE UP (ref 0–0.2)
BASOPHILS NFR BLD AUTO: 0.6 % — SIGNIFICANT CHANGE UP (ref 0–2)
EOSINOPHIL # BLD AUTO: 0.4 K/UL — SIGNIFICANT CHANGE UP (ref 0–0.5)
EOSINOPHIL NFR BLD AUTO: 4.7 % — SIGNIFICANT CHANGE UP (ref 0–6)
HCT VFR BLD CALC: 37 % — LOW (ref 39–50)
HGB BLD-MCNC: 13.4 G/DL — SIGNIFICANT CHANGE UP (ref 13–17)
LYMPHOCYTES # BLD AUTO: 1.5 K/UL — SIGNIFICANT CHANGE UP (ref 1–3.3)
LYMPHOCYTES # BLD AUTO: 19.9 % — SIGNIFICANT CHANGE UP (ref 13–44)
MCHC RBC-ENTMCNC: 33.9 PG — SIGNIFICANT CHANGE UP (ref 27–34)
MCHC RBC-ENTMCNC: 36.1 G/DL — HIGH (ref 32–36)
MCV RBC AUTO: 93.8 FL — SIGNIFICANT CHANGE UP (ref 80–100)
MONOCYTES # BLD AUTO: 0.5 K/UL — SIGNIFICANT CHANGE UP (ref 0–0.9)
MONOCYTES NFR BLD AUTO: 7.3 % — SIGNIFICANT CHANGE UP (ref 2–14)
NEUTROPHILS # BLD AUTO: 5 K/UL — SIGNIFICANT CHANGE UP (ref 1.8–7.4)
NEUTROPHILS NFR BLD AUTO: 67.4 % — SIGNIFICANT CHANGE UP (ref 43–77)
PLATELET # BLD AUTO: 221 K/UL — SIGNIFICANT CHANGE UP (ref 150–400)
RBC # BLD: 3.94 M/UL — LOW (ref 4.2–5.8)
RBC # FLD: 11.4 % — SIGNIFICANT CHANGE UP (ref 10.3–14.5)
WBC # BLD: 7.4 K/UL — SIGNIFICANT CHANGE UP (ref 3.8–10.5)
WBC # FLD AUTO: 7.4 K/UL — SIGNIFICANT CHANGE UP (ref 3.8–10.5)

## 2019-11-01 PROCEDURE — 99213 OFFICE O/P EST LOW 20 MIN: CPT

## 2019-11-01 NOTE — PHYSICAL EXAM
[Normal] : affect appropriate [Fully active, able to carry on all pre-disease performance without restriction] : Status 0 - Fully active, able to carry on all pre-disease performance without restriction

## 2019-11-01 NOTE — ASSESSMENT
[FreeTextEntry1] : Mild anemia has resolved. \par 11/1/19 labs; WBC 7.4, Hb 13.4 g/dl, RBC 3.94, MCV 93.8, Hct 37%, RDW 11.4%, . \par \par Patient is feeling well. Patient drinks two vodkas with dinner every day. \par \par Peripheral smear c/w : normochromic normocytic, few large RBCs , no leukoerythroblastic changes, PLT count appropriate, no evidence of lymphoproliferative disorder.\par \par The changes in MCHC is likely secondary to toxicity from alcohol.\par \par RTC 6 months to 1 year. \par

## 2019-11-01 NOTE — HISTORY OF PRESENT ILLNESS
[0 - No Distress] : Distress Level: 0 [de-identified] : Mr. Padgett was referred by Dr. Cisneros for initial consultation for anemia. \par On , hemoglobin was 11.2 with MCV 94.6 RDW 13.2. White blood count and platelet count were normal\par Serum iron was 123 and ferritin 522\par On , the hemoglobin was 12.5 with MCV 96 and RDW 13.7\par Comprehensive metabolic panel revealed a bilirubin of 1.8\par \par In 2016 hemoglobin was 14.3 with MCV 89 and RDW 13\par In 2016 hemoglobin was 14.4 with MCV of 93.8 and RDW of 13.5\par \par Diagnosed with Prostate Cancer in 2017, s/p EBRT 2- with no neoadjuvant or concurrent androgen deprivation therapy\par \par \par colonoscopy- benign polyps, \par prostate exam- 2017\par \par former smoker, quit in \par 2 cocktails daily\par , spouse with multiple medical problems\par son- 50\par daughter- 48\par 2 granddaughters\par \par father- unknown\par mother-  age 92\par 2 sisters- youngest- breast cancer survivor, 1 brother. \par \par Hemochromatosis 18 was negative.  [de-identified] : \par 11/1/19 labs; WBC 7.4, Hb 13.4 g/dl, RBC 3.94, MCV 93.8, Hct 37%, RDW 11.4%, . \par \par Patient is feeling well, denies s/s of acute distress. Patient drinks two vodkas with dinner every day. \par \par No other changes in his medical, surgical or social history since 1/3/19.

## 2019-11-13 ENCOUNTER — RX RENEWAL (OUTPATIENT)
Age: 74
End: 2019-11-13

## 2020-02-23 ENCOUNTER — RX RENEWAL (OUTPATIENT)
Age: 75
End: 2020-02-23

## 2020-05-11 ENCOUNTER — APPOINTMENT (OUTPATIENT)
Dept: INTERNAL MEDICINE | Facility: CLINIC | Age: 75
End: 2020-05-11
Payer: MEDICARE

## 2020-05-11 VITALS — SYSTOLIC BLOOD PRESSURE: 158 MMHG | DIASTOLIC BLOOD PRESSURE: 80 MMHG

## 2020-05-11 VITALS
DIASTOLIC BLOOD PRESSURE: 70 MMHG | SYSTOLIC BLOOD PRESSURE: 161 MMHG | BODY MASS INDEX: 23.99 KG/M2 | HEIGHT: 69 IN | OXYGEN SATURATION: 99 % | HEART RATE: 66 BPM | WEIGHT: 162 LBS | TEMPERATURE: 98.6 F

## 2020-05-11 PROCEDURE — 99214 OFFICE O/P EST MOD 30 MIN: CPT

## 2020-05-11 RX ORDER — TELMISARTAN 80 MG/1
80 TABLET ORAL DAILY
Qty: 90 | Refills: 3 | Status: DISCONTINUED | COMMUNITY
Start: 2019-02-17 | End: 2020-05-11

## 2020-05-11 NOTE — HISTORY OF PRESENT ILLNESS
[FreeTextEntry1] : for f/u  of htn hyperlipidemia  predm  and anemia  he says his  diet re carbs is variable  he does execise.  he remains on same bp and lipid  rx with no ef side effects. he has no change in mild  long standing sob  and he has had no cp

## 2020-05-11 NOTE — PHYSICAL EXAM
[No Acute Distress] : no acute distress [Well Nourished] : well nourished [Normal Sclera/Conjunctiva] : normal sclera/conjunctiva [Normal Outer Ear/Nose] : the outer ears and nose were normal in appearance [Supple] : supple [No JVD] : no jugular venous distention [No Respiratory Distress] : no respiratory distress  [No Accessory Muscle Use] : no accessory muscle use [Normal Rate] : normal rate  [Clear to Auscultation] : lungs were clear to auscultation bilaterally [Regular Rhythm] : with a regular rhythm [Normal S1, S2] : normal S1 and S2 [Coordination Grossly Intact] : coordination grossly intact [Speech Grossly Normal] : speech grossly normal [Normal Gait] : normal gait [Memory Grossly Normal] : memory grossly normal [Normal Affect] : the affect was normal [Normal Mood] : the mood was normal [Normal Insight/Judgement] : insight and judgment were intact

## 2020-07-06 ENCOUNTER — NON-APPOINTMENT (OUTPATIENT)
Age: 75
End: 2020-07-06

## 2020-07-06 ENCOUNTER — APPOINTMENT (OUTPATIENT)
Dept: INTERNAL MEDICINE | Facility: CLINIC | Age: 75
End: 2020-07-06
Payer: MEDICARE

## 2020-07-06 VITALS
SYSTOLIC BLOOD PRESSURE: 110 MMHG | DIASTOLIC BLOOD PRESSURE: 58 MMHG | HEART RATE: 66 BPM | OXYGEN SATURATION: 98 % | BODY MASS INDEX: 23.48 KG/M2 | WEIGHT: 159 LBS | TEMPERATURE: 98.4 F

## 2020-07-06 DIAGNOSIS — E78.00 PURE HYPERCHOLESTEROLEMIA, UNSPECIFIED: ICD-10-CM

## 2020-07-06 PROCEDURE — 99214 OFFICE O/P EST MOD 30 MIN: CPT | Mod: 25

## 2020-07-06 PROCEDURE — 93000 ELECTROCARDIOGRAM COMPLETE: CPT

## 2020-07-06 NOTE — HISTORY OF PRESENT ILLNESS
[FreeTextEntry1] : for f/u  bp  predm   and hld  he is not exercising enough but says he is esting welol.  he has occ dizziness when he stands  up  andd he denies any cp but has mild stable chronic sob with stairs  no aches pains from statin

## 2020-07-06 NOTE — PHYSICAL EXAM
[No Acute Distress] : no acute distress [Well Nourished] : well nourished [Well Developed] : well developed [Well-Appearing] : well-appearing [Normal Sclera/Conjunctiva] : normal sclera/conjunctiva [PERRL] : pupils equal round and reactive to light [EOMI] : extraocular movements intact [Normal Oropharynx] : the oropharynx was normal [Normal Outer Ear/Nose] : the outer ears and nose were normal in appearance [No JVD] : no jugular venous distention [No Lymphadenopathy] : no lymphadenopathy [Supple] : supple [Thyroid Normal, No Nodules] : the thyroid was normal and there were no nodules present [No Respiratory Distress] : no respiratory distress  [No Accessory Muscle Use] : no accessory muscle use [Clear to Auscultation] : lungs were clear to auscultation bilaterally [Normal Rate] : normal rate  [Regular Rhythm] : with a regular rhythm [Normal S1, S2] : normal S1 and S2 [No Murmur] : no murmur heard [No Carotid Bruits] : no carotid bruits [No Abdominal Bruit] : a ~M bruit was not heard ~T in the abdomen [No Varicosities] : no varicosities [Pedal Pulses Present] : the pedal pulses are present [No Edema] : there was no peripheral edema [No Palpable Aorta] : no palpable aorta [No Extremity Clubbing/Cyanosis] : no extremity clubbing/cyanosis [Soft] : abdomen soft [Non Tender] : non-tender [Non-distended] : non-distended [No HSM] : no HSM [Normal Bowel Sounds] : normal bowel sounds [No Masses] : no abdominal mass palpated [Normal Posterior Cervical Nodes] : no posterior cervical lymphadenopathy [Normal Anterior Cervical Nodes] : no anterior cervical lymphadenopathy [No CVA Tenderness] : no CVA  tenderness [No Joint Swelling] : no joint swelling [No Spinal Tenderness] : no spinal tenderness [No Rash] : no rash [Grossly Normal Strength/Tone] : grossly normal strength/tone [No Focal Deficits] : no focal deficits [Coordination Grossly Intact] : coordination grossly intact [Normal Gait] : normal gait [Deep Tendon Reflexes (DTR)] : deep tendon reflexes were 2+ and symmetric [Normal Affect] : the affect was normal [Normal Insight/Judgement] : insight and judgment were intact

## 2020-08-24 ENCOUNTER — RX RENEWAL (OUTPATIENT)
Age: 75
End: 2020-08-24

## 2020-10-01 ENCOUNTER — APPOINTMENT (OUTPATIENT)
Dept: INTERNAL MEDICINE | Facility: CLINIC | Age: 75
End: 2020-10-01
Payer: MEDICARE

## 2020-10-01 PROCEDURE — G0008: CPT

## 2020-10-01 PROCEDURE — 90662 IIV NO PRSV INCREASED AG IM: CPT

## 2021-01-04 ENCOUNTER — APPOINTMENT (OUTPATIENT)
Dept: INTERNAL MEDICINE | Facility: CLINIC | Age: 76
End: 2021-01-04

## 2021-04-01 ENCOUNTER — APPOINTMENT (OUTPATIENT)
Dept: INTERNAL MEDICINE | Facility: CLINIC | Age: 76
End: 2021-04-01
Payer: MEDICARE

## 2021-04-01 VITALS
HEART RATE: 74 BPM | TEMPERATURE: 97.8 F | DIASTOLIC BLOOD PRESSURE: 70 MMHG | SYSTOLIC BLOOD PRESSURE: 121 MMHG | OXYGEN SATURATION: 99 % | HEIGHT: 69 IN | BODY MASS INDEX: 24.44 KG/M2 | WEIGHT: 165 LBS

## 2021-04-01 DIAGNOSIS — J43.9 EMPHYSEMA, UNSPECIFIED: ICD-10-CM

## 2021-04-01 DIAGNOSIS — R42 DIZZINESS AND GIDDINESS: ICD-10-CM

## 2021-04-01 PROCEDURE — 99214 OFFICE O/P EST MOD 30 MIN: CPT

## 2021-04-01 PROCEDURE — 99072 ADDL SUPL MATRL&STAF TM PHE: CPT

## 2021-04-01 NOTE — PHYSICAL EXAM
[No Acute Distress] : no acute distress [Well Nourished] : well nourished [Well Developed] : well developed [Well-Appearing] : well-appearing [Normal Sclera/Conjunctiva] : normal sclera/conjunctiva [PERRL] : pupils equal round and reactive to light [EOMI] : extraocular movements intact [Normal Outer Ear/Nose] : the outer ears and nose were normal in appearance [No JVD] : no jugular venous distention [Supple] : supple [Thyroid Normal, No Nodules] : the thyroid was normal and there were no nodules present [No Respiratory Distress] : no respiratory distress  [No Accessory Muscle Use] : no accessory muscle use [Clear to Auscultation] : lungs were clear to auscultation bilaterally [Normal Rate] : normal rate  [Regular Rhythm] : with a regular rhythm [Normal S1, S2] : normal S1 and S2 [No Murmur] : no murmur heard [No Carotid Bruits] : no carotid bruits [No Varicosities] : no varicosities [Pedal Pulses Present] : the pedal pulses are present [No Edema] : there was no peripheral edema [No Palpable Aorta] : no palpable aorta [No Extremity Clubbing/Cyanosis] : no extremity clubbing/cyanosis [Soft] : abdomen soft [Non Tender] : non-tender [Non-distended] : non-distended [No Masses] : no abdominal mass palpated [No HSM] : no HSM [Normal Posterior Cervical Nodes] : no posterior cervical lymphadenopathy [Normal Anterior Cervical Nodes] : no anterior cervical lymphadenopathy [No CVA Tenderness] : no CVA  tenderness [No Spinal Tenderness] : no spinal tenderness [No Joint Swelling] : no joint swelling [Grossly Normal Strength/Tone] : grossly normal strength/tone [No Rash] : no rash [Coordination Grossly Intact] : coordination grossly intact [No Focal Deficits] : no focal deficits [Normal Gait] : normal gait [Deep Tendon Reflexes (DTR)] : deep tendon reflexes were 2+ and symmetric [Normal Affect] : the affect was normal [Normal Insight/Judgement] : insight and judgment were intact

## 2021-04-01 NOTE — HISTORY OF PRESENT ILLNESS
[FreeTextEntry1] : last 3-4 months has had lightheadedness  when he stands up or changes his head position. he also  says his post nasal drip and cough in the morning are worsening.  he has used flonase in the past.  he had his chest ct  which reported mild emphysema which he is concerned about  he denies sob at anytime.  he does not exercise

## 2021-04-25 ENCOUNTER — RX RENEWAL (OUTPATIENT)
Age: 76
End: 2021-04-25

## 2021-04-25 ENCOUNTER — APPOINTMENT (OUTPATIENT)
Dept: DISASTER EMERGENCY | Facility: CLINIC | Age: 76
End: 2021-04-25

## 2021-04-25 DIAGNOSIS — Z01.818 ENCOUNTER FOR OTHER PREPROCEDURAL EXAMINATION: ICD-10-CM

## 2021-04-25 LAB — SARS-COV-2 N GENE NPH QL NAA+PROBE: NOT DETECTED

## 2021-04-30 ENCOUNTER — APPOINTMENT (OUTPATIENT)
Dept: PULMONOLOGY | Facility: CLINIC | Age: 76
End: 2021-04-30

## 2021-09-02 DIAGNOSIS — R91.8 OTHER NONSPECIFIC ABNORMAL FINDING OF LUNG FIELD: ICD-10-CM

## 2021-10-15 ENCOUNTER — APPOINTMENT (OUTPATIENT)
Dept: INTERNAL MEDICINE | Facility: CLINIC | Age: 76
End: 2021-10-15
Payer: MEDICARE

## 2021-10-15 VITALS
SYSTOLIC BLOOD PRESSURE: 146 MMHG | WEIGHT: 153 LBS | OXYGEN SATURATION: 98 % | TEMPERATURE: 97.8 F | DIASTOLIC BLOOD PRESSURE: 73 MMHG | HEIGHT: 69 IN | BODY MASS INDEX: 22.66 KG/M2 | HEART RATE: 71 BPM

## 2021-10-15 VITALS — SYSTOLIC BLOOD PRESSURE: 130 MMHG | DIASTOLIC BLOOD PRESSURE: 60 MMHG

## 2021-10-15 DIAGNOSIS — Z23 ENCOUNTER FOR IMMUNIZATION: ICD-10-CM

## 2021-10-15 DIAGNOSIS — R91.8 OTHER NONSPECIFIC ABNORMAL FINDING OF LUNG FIELD: ICD-10-CM

## 2021-10-15 DIAGNOSIS — R63.4 ABNORMAL WEIGHT LOSS: ICD-10-CM

## 2021-10-15 PROCEDURE — 99214 OFFICE O/P EST MOD 30 MIN: CPT | Mod: 25

## 2021-10-15 PROCEDURE — G0008: CPT

## 2021-10-15 PROCEDURE — 90662 IIV NO PRSV INCREASED AG IM: CPT

## 2021-10-15 PROCEDURE — 36415 COLL VENOUS BLD VENIPUNCTURE: CPT

## 2021-10-15 NOTE — PHYSICAL EXAM
[No Acute Distress] : no acute distress [Well Nourished] : well nourished [Well Developed] : well developed [Well-Appearing] : well-appearing [Normal Sclera/Conjunctiva] : normal sclera/conjunctiva [EOMI] : extraocular movements intact [Normal Outer Ear/Nose] : the outer ears and nose were normal in appearance [No JVD] : no jugular venous distention [No Lymphadenopathy] : no lymphadenopathy [Supple] : supple [Thyroid Normal, No Nodules] : the thyroid was normal and there were no nodules present [No Respiratory Distress] : no respiratory distress  [No Accessory Muscle Use] : no accessory muscle use [Clear to Auscultation] : lungs were clear to auscultation bilaterally [Normal Rate] : normal rate  [Regular Rhythm] : with a regular rhythm [Normal S1, S2] : normal S1 and S2 [No Murmur] : no murmur heard [No Carotid Bruits] : no carotid bruits [No Varicosities] : no varicosities [Pedal Pulses Present] : the pedal pulses are present [No Edema] : there was no peripheral edema [No Palpable Aorta] : no palpable aorta [No Extremity Clubbing/Cyanosis] : no extremity clubbing/cyanosis [Soft] : abdomen soft [Non Tender] : non-tender [Non-distended] : non-distended [No Masses] : no abdominal mass palpated [No HSM] : no HSM [Normal Bowel Sounds] : normal bowel sounds [Normal Posterior Cervical Nodes] : no posterior cervical lymphadenopathy [Normal Anterior Cervical Nodes] : no anterior cervical lymphadenopathy [No Joint Swelling] : no joint swelling [Grossly Normal Strength/Tone] : grossly normal strength/tone [Coordination Grossly Intact] : coordination grossly intact [No Focal Deficits] : no focal deficits [Normal Gait] : normal gait [Deep Tendon Reflexes (DTR)] : deep tendon reflexes were 2+ and symmetric [Normal Affect] : the affect was normal [Normal Insight/Judgement] : insight and judgment were intact

## 2021-10-15 NOTE — HISTORY OF PRESENT ILLNESS
[FreeTextEntry1] : f/u  htn  hld. also says 10 lb wt loss last  1 year though thinks has stabilized last 2 months.  ct chest done in 8/21  needs f/u next year.  he had colonoscopy in 2016.  feels good.  no  side effects from meds

## 2021-10-18 ENCOUNTER — APPOINTMENT (OUTPATIENT)
Dept: INTERNAL MEDICINE | Facility: CLINIC | Age: 76
End: 2021-10-18
Payer: MEDICARE

## 2021-10-18 DIAGNOSIS — R79.89 OTHER SPECIFIED ABNORMAL FINDINGS OF BLOOD CHEMISTRY: ICD-10-CM

## 2021-10-18 LAB
ALBUMIN SERPL ELPH-MCNC: 4.6 G/DL
ALP BLD-CCNC: 77 U/L
ALT SERPL-CCNC: 21 U/L
ANION GAP SERPL CALC-SCNC: 13 MMOL/L
AST SERPL-CCNC: 25 U/L
BASOPHILS # BLD AUTO: 0.06 K/UL
BASOPHILS NFR BLD AUTO: 1 %
BILIRUB SERPL-MCNC: 1.5 MG/DL
BUN SERPL-MCNC: 33 MG/DL
CALCIUM SERPL-MCNC: 10.5 MG/DL
CARD LOT #: NORMAL
CARD LOT EXP DATE: NORMAL
CHLORIDE SERPL-SCNC: 100 MMOL/L
CHOLEST SERPL-MCNC: 160 MG/DL
CO2 SERPL-SCNC: 23 MMOL/L
CREAT SERPL-MCNC: 1.48 MG/DL
DATE COLLECTED: NORMAL
DEVELOPER LOT #: NORMAL
DEVELOPER LOT EXP DATE: NORMAL
EOSINOPHIL # BLD AUTO: 0.25 K/UL
EOSINOPHIL NFR BLD AUTO: 4.2 %
ESTIMATED AVERAGE GLUCOSE: 105 MG/DL
GLUCOSE SERPL-MCNC: 106 MG/DL
HBA1C MFR BLD HPLC: 5.3 %
HCT VFR BLD CALC: 34.4 %
HDLC SERPL-MCNC: 59 MG/DL
HEMOCCULT 2: NEGATIVE
HEMOCCULT 3: NEGATIVE
HEMOCCULT SP1 STL QL: NEGATIVE
HGB BLD-MCNC: 11.4 G/DL
IMM GRANULOCYTES NFR BLD AUTO: 0.2 %
LDLC SERPL CALC-MCNC: 91 MG/DL
LYMPHOCYTES # BLD AUTO: 1.37 K/UL
LYMPHOCYTES NFR BLD AUTO: 23.3 %
MAN DIFF?: NORMAL
MCHC RBC-ENTMCNC: 31.7 PG
MCHC RBC-ENTMCNC: 33.1 GM/DL
MCV RBC AUTO: 95.6 FL
MONOCYTES # BLD AUTO: 0.47 K/UL
MONOCYTES NFR BLD AUTO: 8 %
NEUTROPHILS # BLD AUTO: 3.73 K/UL
NEUTROPHILS NFR BLD AUTO: 63.3 %
NONHDLC SERPL-MCNC: 101 MG/DL
PLATELET # BLD AUTO: 254 K/UL
POTASSIUM SERPL-SCNC: 5.5 MMOL/L
PROT SERPL-MCNC: 6.8 G/DL
QUALITY CONTROL: YES
RBC # BLD: 3.6 M/UL
RBC # FLD: 12 %
SODIUM SERPL-SCNC: 136 MMOL/L
TRIGL SERPL-MCNC: 50 MG/DL
WBC # FLD AUTO: 5.89 K/UL

## 2021-10-18 PROCEDURE — 82270 OCCULT BLOOD FECES: CPT

## 2021-12-10 ENCOUNTER — APPOINTMENT (OUTPATIENT)
Dept: INTERNAL MEDICINE | Facility: CLINIC | Age: 76
End: 2021-12-10
Payer: MEDICARE

## 2021-12-10 VITALS
HEIGHT: 60 IN | SYSTOLIC BLOOD PRESSURE: 139 MMHG | TEMPERATURE: 97.7 F | HEART RATE: 69 BPM | OXYGEN SATURATION: 99 % | DIASTOLIC BLOOD PRESSURE: 67 MMHG | WEIGHT: 154 LBS | BODY MASS INDEX: 30.23 KG/M2

## 2021-12-10 VITALS — SYSTOLIC BLOOD PRESSURE: 125 MMHG | DIASTOLIC BLOOD PRESSURE: 60 MMHG

## 2021-12-10 DIAGNOSIS — R05.3 CHRONIC COUGH: ICD-10-CM

## 2021-12-10 DIAGNOSIS — R79.89 OTHER SPECIFIED ABNORMAL FINDINGS OF BLOOD CHEMISTRY: ICD-10-CM

## 2021-12-10 PROCEDURE — 99214 OFFICE O/P EST MOD 30 MIN: CPT | Mod: 25

## 2021-12-10 PROCEDURE — 36415 COLL VENOUS BLD VENIPUNCTURE: CPT

## 2021-12-10 RX ORDER — IPRATROPIUM BROMIDE 42 UG/1
0.06 SPRAY NASAL 3 TIMES DAILY
Qty: 45 | Refills: 3 | Status: ACTIVE | COMMUNITY
Start: 2021-12-10 | End: 1900-01-01

## 2021-12-10 NOTE — HISTORY OF PRESENT ILLNESS
[FreeTextEntry1] : follow up  for  elevated cr  htn  and cough.  he says he is coughing  mainly in the am but at times thru the day  with significant  runny nose.he saw an ent who said everything was normal\par   he has to clear his throat. he has no heartburn  he says he is drinking  more.

## 2021-12-10 NOTE — PHYSICAL EXAM
[Normal Sclera/Conjunctiva] : normal sclera/conjunctiva [Normal Oropharynx] : the oropharynx was normal [Normal TMs] : both tympanic membranes were normal [No JVD] : no jugular venous distention [Supple] : supple [No Respiratory Distress] : no respiratory distress  [No Accessory Muscle Use] : no accessory muscle use [Clear to Auscultation] : lungs were clear to auscultation bilaterally [Normal Rate] : normal rate  [Normal S1, S2] : normal S1 and S2 [No Edema] : there was no peripheral edema [Soft] : abdomen soft [Non Tender] : non-tender [Non-distended] : non-distended [No HSM] : no HSM [Coordination Grossly Intact] : coordination grossly intact [Normal Gait] : normal gait [Normal] : affect was normal and insight and judgment were intact

## 2021-12-12 LAB
ANION GAP SERPL CALC-SCNC: 13 MMOL/L
BUN SERPL-MCNC: 22 MG/DL
CALCIUM SERPL-MCNC: 10.1 MG/DL
CHLORIDE SERPL-SCNC: 100 MMOL/L
CO2 SERPL-SCNC: 23 MMOL/L
CREAT SERPL-MCNC: 1.26 MG/DL
GLUCOSE SERPL-MCNC: 114 MG/DL
POTASSIUM SERPL-SCNC: 4.5 MMOL/L
SODIUM SERPL-SCNC: 136 MMOL/L

## 2022-01-09 ENCOUNTER — APPOINTMENT (OUTPATIENT)
Dept: INTERNAL MEDICINE | Facility: CLINIC | Age: 77
End: 2022-01-09
Payer: MEDICARE

## 2022-01-09 VITALS
DIASTOLIC BLOOD PRESSURE: 68 MMHG | OXYGEN SATURATION: 97 % | SYSTOLIC BLOOD PRESSURE: 126 MMHG | WEIGHT: 153 LBS | TEMPERATURE: 100 F | BODY MASS INDEX: 29.88 KG/M2 | HEART RATE: 69 BPM

## 2022-01-09 DIAGNOSIS — B34.9 VIRAL INFECTION, UNSPECIFIED: ICD-10-CM

## 2022-01-09 PROCEDURE — 99213 OFFICE O/P EST LOW 20 MIN: CPT

## 2022-01-09 NOTE — PHYSICAL EXAM
[Normal Sclera/Conjunctiva] : normal sclera/conjunctiva [Normal Outer Ear/Nose] : the outer ears and nose were normal in appearance [Normal Oropharynx] : the oropharynx was normal [No JVD] : no jugular venous distention [Supple] : supple [No Respiratory Distress] : no respiratory distress  [No Accessory Muscle Use] : no accessory muscle use [Clear to Auscultation] : lungs were clear to auscultation bilaterally [Normal Rate] : normal rate  [Regular Rhythm] : with a regular rhythm [Coordination Grossly Intact] : coordination grossly intact [Normal Gait] : normal gait [Normal] : affect was normal and insight and judgment were intact

## 2022-01-09 NOTE — REVIEW OF SYSTEMS
[Fever] : fever [Sore Throat] : sore throat [Shortness Of Breath] : no shortness of breath [Cough] : cough [Negative] : Gastrointestinal

## 2022-01-09 NOTE — HISTORY OF PRESENT ILLNESS
[FreeTextEntry8] : since yesterday  cough  temp 100 and feeling very tired. fully vaccinated for covid and reports a home rapid teste is negative. no gi sx. has chronic runny nose. says he feels a little better today.

## 2022-05-26 ENCOUNTER — NON-APPOINTMENT (OUTPATIENT)
Age: 77
End: 2022-05-26

## 2022-05-27 ENCOUNTER — APPOINTMENT (OUTPATIENT)
Dept: INTERNAL MEDICINE | Facility: CLINIC | Age: 77
End: 2022-05-27
Payer: MEDICARE

## 2022-05-27 VITALS
HEART RATE: 66 BPM | BODY MASS INDEX: 21.14 KG/M2 | DIASTOLIC BLOOD PRESSURE: 71 MMHG | HEIGHT: 71 IN | WEIGHT: 151 LBS | TEMPERATURE: 97.8 F | SYSTOLIC BLOOD PRESSURE: 133 MMHG | OXYGEN SATURATION: 98 %
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
## 2022-05-27 DIAGNOSIS — S29.9XXA UNSPECIFIED INJURY OF THORAX, INITIAL ENCOUNTER: ICD-10-CM

## 2022-05-27 PROCEDURE — 99213 OFFICE O/P EST LOW 20 MIN: CPT

## 2022-05-27 NOTE — PHYSICAL EXAM
[Normal] : no acute distress, well nourished, well developed and well-appearing [Normal Outer Ear/Nose] : the outer ears and nose were normal in appearance [No JVD] : no jugular venous distention [No Respiratory Distress] : no respiratory distress  [No Accessory Muscle Use] : no accessory muscle use [Clear to Auscultation] : lungs were clear to auscultation bilaterally [Normal Rate] : normal rate  [Regular Rhythm] : with a regular rhythm [Normal S1, S2] : normal S1 and S2 [de-identified] : slight tenderness over loer tight rib in the back

## 2022-08-28 ENCOUNTER — RX RENEWAL (OUTPATIENT)
Age: 77
End: 2022-08-28

## 2023-01-04 ENCOUNTER — NON-APPOINTMENT (OUTPATIENT)
Age: 78
End: 2023-01-04

## 2023-01-04 ENCOUNTER — APPOINTMENT (OUTPATIENT)
Dept: INTERNAL MEDICINE | Facility: CLINIC | Age: 78
End: 2023-01-04
Payer: MEDICARE

## 2023-01-04 VITALS
OXYGEN SATURATION: 99 % | WEIGHT: 153 LBS | TEMPERATURE: 97.6 F | SYSTOLIC BLOOD PRESSURE: 129 MMHG | DIASTOLIC BLOOD PRESSURE: 68 MMHG | BODY MASS INDEX: 21.42 KG/M2 | HEIGHT: 71 IN | HEART RATE: 69 BPM

## 2023-01-04 DIAGNOSIS — I10 ESSENTIAL (PRIMARY) HYPERTENSION: ICD-10-CM

## 2023-01-04 DIAGNOSIS — F10.20 ALCOHOL DEPENDENCE, UNCOMPLICATED: ICD-10-CM

## 2023-01-04 DIAGNOSIS — D64.9 ANEMIA, UNSPECIFIED: ICD-10-CM

## 2023-01-04 DIAGNOSIS — Z86.39 PERSONAL HISTORY OF OTHER ENDOCRINE, NUTRITIONAL AND METABOLIC DISEASE: ICD-10-CM

## 2023-01-04 DIAGNOSIS — Z87.898 PERSONAL HISTORY OF OTHER SPECIFIED CONDITIONS: ICD-10-CM

## 2023-01-04 DIAGNOSIS — R73.03 PREDIABETES.: ICD-10-CM

## 2023-01-04 DIAGNOSIS — Z86.010 PERSONAL HISTORY OF COLONIC POLYPS: ICD-10-CM

## 2023-01-04 PROCEDURE — 93000 ELECTROCARDIOGRAM COMPLETE: CPT

## 2023-01-04 PROCEDURE — G0009: CPT

## 2023-01-04 PROCEDURE — 90677 PCV20 VACCINE IM: CPT

## 2023-01-04 PROCEDURE — 99215 OFFICE O/P EST HI 40 MIN: CPT | Mod: 25

## 2023-01-04 PROCEDURE — 36415 COLL VENOUS BLD VENIPUNCTURE: CPT

## 2023-01-04 RX ORDER — AZELASTINE HYDROCHLORIDE AND FLUTICASONE PROPIONATE 137; 50 UG/1; UG/1
137-50 SPRAY, METERED NASAL DAILY
Qty: 1 | Refills: 3 | Status: ACTIVE | COMMUNITY
Start: 2023-01-04

## 2023-01-04 RX ORDER — OMEPRAZOLE 40 MG/1
40 CAPSULE, DELAYED RELEASE ORAL
Qty: 90 | Refills: 3 | Status: DISCONTINUED | COMMUNITY
Start: 2021-12-10 | End: 2023-01-04

## 2023-01-04 NOTE — REVIEW OF SYSTEMS
[Negative] : Neurological [FreeTextEntry6] : as  above [FreeTextEntry8] : ppor stream [de-identified] : basal cell removed

## 2023-01-04 NOTE — PHYSICAL EXAM
[No Acute Distress] : no acute distress [Normal] : no acute distress, well nourished, well developed and well-appearing [Normal Sclera/Conjunctiva] : normal sclera/conjunctiva [Normal Outer Ear/Nose] : the outer ears and nose were normal in appearance [No JVD] : no jugular venous distention [Supple] : supple [No Respiratory Distress] : no respiratory distress  [No Accessory Muscle Use] : no accessory muscle use [Clear to Auscultation] : lungs were clear to auscultation bilaterally [Normal Rate] : normal rate  [Regular Rhythm] : with a regular rhythm [Normal S1, S2] : normal S1 and S2 [No Edema] : there was no peripheral edema [Soft] : abdomen soft [Non Tender] : non-tender [No HSM] : no HSM [Speech Grossly Normal] : speech grossly normal [Normal Insight/Judgement] : insight and judgment were intact

## 2023-01-04 NOTE — HISTORY OF PRESENT ILLNESS
[FreeTextEntry1] : f/uu chronic   med  problems cough no better with  nasal spray  he declines atrovent nasal and says will live with cough  saw another pulmonologist  did not do ct due in sept on chest for possibly enlarging density  some walking  watches sweets still drinks alcohol  had  colonoscopy in 2022  had 2 polyps.

## 2023-01-05 LAB
CHOLEST SERPL-MCNC: 153 MG/DL
ESTIMATED AVERAGE GLUCOSE: 103 MG/DL
HBA1C MFR BLD HPLC: 5.2 %
HDLC SERPL-MCNC: 62 MG/DL
LDLC SERPL CALC-MCNC: 80 MG/DL
NONHDLC SERPL-MCNC: 91 MG/DL
PSA SERPL-MCNC: 0.09 NG/ML
TRIGL SERPL-MCNC: 57 MG/DL

## 2023-01-30 ENCOUNTER — APPOINTMENT (OUTPATIENT)
Dept: INTERNAL MEDICINE | Facility: CLINIC | Age: 78
End: 2023-01-30

## 2023-09-15 ENCOUNTER — OFFICE (OUTPATIENT)
Facility: LOCATION | Age: 78
Setting detail: OPHTHALMOLOGY
End: 2023-09-15
Payer: MEDICARE

## 2023-09-15 DIAGNOSIS — H01.005: ICD-10-CM

## 2023-09-15 DIAGNOSIS — H25.13: ICD-10-CM

## 2023-09-15 DIAGNOSIS — H01.002: ICD-10-CM

## 2023-09-15 DIAGNOSIS — H25.12: ICD-10-CM

## 2023-09-15 DIAGNOSIS — H25.11: ICD-10-CM

## 2023-09-15 DIAGNOSIS — H52.4: ICD-10-CM

## 2023-09-15 DIAGNOSIS — H01.001: ICD-10-CM

## 2023-09-15 DIAGNOSIS — H01.004: ICD-10-CM

## 2023-09-15 PROCEDURE — 92015 DETERMINE REFRACTIVE STATE: CPT | Performed by: OPHTHALMOLOGY

## 2023-09-15 PROCEDURE — 92004 COMPRE OPH EXAM NEW PT 1/>: CPT | Performed by: OPHTHALMOLOGY

## 2023-09-15 PROCEDURE — 92020 GONIOSCOPY: CPT | Performed by: OPHTHALMOLOGY

## 2023-09-15 ASSESSMENT — KERATOMETRY
OS_AXISANGLE_DEGREES: 082
OD_K1POWER_DIOPTERS: 41.25
OS_K2POWER_DIOPTERS: 39.25
OD_K2POWER_DIOPTERS: 40.00
OD_AXISANGLE_DEGREES: 082
OS_K1POWER_DIOPTERS: 40.75

## 2023-09-15 ASSESSMENT — REFRACTION_MANIFEST
OS_ADD: +3.00
OS_AXIS: 075
OS_SPHERE: +1.75
OD_SPHERE: +3.00
OD_CYLINDER: -1.75
OD_ADD: +3.00
OS_CYLINDER: -2.25
OD_VA1: 20/20-2
OD_AXIS: 090
OS_VA1: 20/30-1

## 2023-09-15 ASSESSMENT — SPHEQUIV_DERIVED
OS_SPHEQUIV: 0.625
OS_SPHEQUIV: 1.625
OD_SPHEQUIV: 2.875
OD_SPHEQUIV: 2.125

## 2023-09-15 ASSESSMENT — REFRACTION_CURRENTRX
OD_SPHERE: +3.00
OS_SPHERE: +2.00
OD_ADD: +3.00
OS_AXIS: 082
OD_AXIS: 087
OD_OVR_VA: 20/
OS_CYLINDER: -1.75
OS_OVR_VA: 20/
OD_CYLINDER: -1.50
OS_ADD: +3.00

## 2023-09-15 ASSESSMENT — VISUAL ACUITY
OD_BCVA: 20/40-
OS_BCVA: 20/40

## 2023-09-15 ASSESSMENT — LID EXAM ASSESSMENTS
OD_BLEPHARITIS: RLL RUL 1+
OS_BLEPHARITIS: LLL LUL 1+

## 2023-09-15 ASSESSMENT — REFRACTION_AUTOREFRACTION
OD_CYLINDER: -1.75
OD_AXIS: 084
OS_SPHERE: +3.00
OS_CYLINDER: -2.75
OS_AXIS: 081
OD_SPHERE: +3.75

## 2023-09-15 ASSESSMENT — PACHYMETRY
OS_CT_UM: 630
OD_CT_CORRECTION: -6
OS_CT_CORRECTION: -6
OD_CT_UM: 636

## 2023-09-15 ASSESSMENT — AXIALLENGTH_DERIVED
OD_AL: 23.8222
OS_AL: 24.6821
OD_AL: 23.528
OS_AL: 24.2629

## 2023-09-15 ASSESSMENT — CONFRONTATIONAL VISUAL FIELD TEST (CVF)
OS_FINDINGS: FULL
OD_FINDINGS: FULL

## 2023-11-29 ENCOUNTER — RX RENEWAL (OUTPATIENT)
Age: 78
End: 2023-11-29

## 2023-11-29 RX ORDER — ATORVASTATIN CALCIUM 80 MG/1
80 TABLET, FILM COATED ORAL
Qty: 90 | Refills: 0 | Status: ACTIVE | COMMUNITY
Start: 2017-05-10 | End: 1900-01-01

## 2023-11-29 RX ORDER — ATENOLOL 25 MG/1
25 TABLET ORAL
Qty: 270 | Refills: 0 | Status: ACTIVE | COMMUNITY
Start: 2017-07-19 | End: 1900-01-01

## 2023-11-29 RX ORDER — TELMISARTAN AND HYDROCHLOROTHIAZIDE 80; 25 MG/1; MG/1
80-25 TABLET ORAL
Qty: 90 | Refills: 0 | Status: ACTIVE | COMMUNITY
Start: 2020-05-11 | End: 1900-01-01

## 2024-03-15 ENCOUNTER — OFFICE (OUTPATIENT)
Facility: LOCATION | Age: 79
Setting detail: OPHTHALMOLOGY
End: 2024-03-15
Payer: MEDICARE

## 2024-03-15 DIAGNOSIS — H01.004: ICD-10-CM

## 2024-03-15 DIAGNOSIS — H25.043: ICD-10-CM

## 2024-03-15 DIAGNOSIS — H43.811: ICD-10-CM

## 2024-03-15 DIAGNOSIS — H35.373: ICD-10-CM

## 2024-03-15 DIAGNOSIS — H35.3131: ICD-10-CM

## 2024-03-15 DIAGNOSIS — H01.001: ICD-10-CM

## 2024-03-15 DIAGNOSIS — H25.13: ICD-10-CM

## 2024-03-15 PROCEDURE — 92014 COMPRE OPH EXAM EST PT 1/>: CPT | Performed by: OPHTHALMOLOGY

## 2024-03-15 PROCEDURE — 92250 FUNDUS PHOTOGRAPHY W/I&R: CPT | Performed by: OPHTHALMOLOGY

## 2024-03-15 ASSESSMENT — REFRACTION_MANIFEST
OD_AXIS: 090
OS_AXIS: 075
OD_SPHERE: +3.00
OD_ADD: +3.00
OS_VA1: 20/30-1
OS_ADD: +3.00
OS_SPHERE: +1.75
OD_VA1: 20/20-2
OD_CYLINDER: -1.75
OS_CYLINDER: -2.25

## 2024-03-15 ASSESSMENT — REFRACTION_CURRENTRX
OD_SPHERE: +3.25
OS_CYLINDER: -2.25
OD_AXIS: 093
OS_OVR_VA: 20/
OD_OVR_VA: 20/
OD_CYLINDER: -2.00
OD_ADD: +3.00
OS_VPRISM_DIRECTION: PROGS
OS_ADD: +3.00
OS_AXIS: 075
OS_SPHERE: +2.00
OD_VPRISM_DIRECTION: PROGS

## 2024-03-15 ASSESSMENT — LID EXAM ASSESSMENTS
OS_BLEPHARITIS: LLL LUL 1+
OD_BLEPHARITIS: RLL RUL 1+

## 2024-03-15 ASSESSMENT — SPHEQUIV_DERIVED
OS_SPHEQUIV: 0.625
OD_SPHEQUIV: 2.125

## 2024-05-10 NOTE — HISTORY OF PRESENT ILLNESS
[FreeTextEntry8] : fell 2 days ago getting up from chair. fell on right  side and hurts when he coughs.no difficulty breathing  went  to urgent care and told no ribs broken on rib series..has not needed  to take  pain rx  he  says his baalnce is not what it used to be
You can access the FollowMyHealth Patient Portal offered by [facility] by registering at the following website: [email or web address]. By joining [facility]’s FollowMyHealth portal, you will also be able to view your health information using other applications (apps) compatible with our system.

## 2024-09-20 ENCOUNTER — OFFICE (OUTPATIENT)
Facility: LOCATION | Age: 79
Setting detail: OPHTHALMOLOGY
End: 2024-09-20
Payer: MEDICARE

## 2024-09-20 ENCOUNTER — RX ONLY (RX ONLY)
Age: 79
End: 2024-09-20

## 2024-09-20 DIAGNOSIS — H01.001: ICD-10-CM

## 2024-09-20 DIAGNOSIS — H35.373: ICD-10-CM

## 2024-09-20 DIAGNOSIS — H43.811: ICD-10-CM

## 2024-09-20 DIAGNOSIS — H25.043: ICD-10-CM

## 2024-09-20 DIAGNOSIS — H25.13: ICD-10-CM

## 2024-09-20 DIAGNOSIS — H01.004: ICD-10-CM

## 2024-09-20 DIAGNOSIS — H35.3131: ICD-10-CM

## 2024-09-20 PROCEDURE — 92014 COMPRE OPH EXAM EST PT 1/>: CPT | Performed by: OPHTHALMOLOGY

## 2024-09-20 PROCEDURE — 92134 CPTRZ OPH DX IMG PST SGM RTA: CPT | Performed by: OPHTHALMOLOGY

## 2024-09-20 ASSESSMENT — CONFRONTATIONAL VISUAL FIELD TEST (CVF)
OD_FINDINGS: FULL
OS_FINDINGS: FULL

## 2024-09-20 ASSESSMENT — LID EXAM ASSESSMENTS
OD_BLEPHARITIS: RLL RUL 1+
OS_BLEPHARITIS: LLL LUL 1+

## 2024-11-06 ENCOUNTER — OFFICE (OUTPATIENT)
Dept: URBAN - METROPOLITAN AREA CLINIC 77 | Facility: CLINIC | Age: 79
Setting detail: OPHTHALMOLOGY
End: 2024-11-06
Payer: MEDICARE

## 2024-11-06 DIAGNOSIS — H35.3131: ICD-10-CM

## 2024-11-06 DIAGNOSIS — H01.004: ICD-10-CM

## 2024-11-06 DIAGNOSIS — H43.822: ICD-10-CM

## 2024-11-06 DIAGNOSIS — H25.13: ICD-10-CM

## 2024-11-06 DIAGNOSIS — H43.811: ICD-10-CM

## 2024-11-06 DIAGNOSIS — H01.001: ICD-10-CM

## 2024-11-06 DIAGNOSIS — H35.373: ICD-10-CM

## 2024-11-06 DIAGNOSIS — H25.043: ICD-10-CM

## 2024-11-06 PROCEDURE — 99214 OFFICE O/P EST MOD 30 MIN: CPT | Performed by: OPHTHALMOLOGY

## 2024-11-06 PROCEDURE — 92134 CPTRZ OPH DX IMG PST SGM RTA: CPT | Performed by: OPHTHALMOLOGY

## 2024-11-06 PROCEDURE — 92202 OPSCPY EXTND ON/MAC DRAW: CPT | Performed by: OPHTHALMOLOGY

## 2024-11-06 PROCEDURE — 92235 FLUORESCEIN ANGRPH MLTIFRAME: CPT | Performed by: OPHTHALMOLOGY

## 2024-11-06 ASSESSMENT — REFRACTION_CURRENTRX
OS_SPHERE: +3.25
OD_SPHERE: +3.25
OS_CYLINDER: -2.25
OD_CYLINDER: -1.50
OS_ADD: +2.75
OD_VPRISM_DIRECTION: PROGS
OD_OVR_VA: 20/
OD_AXIS: 073
OS_VPRISM_DIRECTION: PROGS
OS_OVR_VA: 20/
OS_AXIS: 084
OD_ADD: +2.75

## 2024-11-06 ASSESSMENT — CONFRONTATIONAL VISUAL FIELD TEST (CVF)
OS_FINDINGS: FULL
OD_FINDINGS: FULL

## 2024-11-06 ASSESSMENT — REFRACTION_AUTOREFRACTION
OD_CYLINDER: -1.50
OS_AXIS: 075
OS_CYLINDER: -2.75
OD_SPHERE: +3.00
OS_SPHERE: +2.75
OD_AXIS: 070

## 2024-11-06 ASSESSMENT — KERATOMETRY
OD_K2POWER_DIOPTERS: 40.25
METHOD_AUTO_MANUAL: AUTO
OD_K1POWER_DIOPTERS: 41.25
OS_K2POWER_DIOPTERS: 49.50
OS_AXISANGLE_DEGREES: 080
OS_K1POWER_DIOPTERS: 41.00
OD_AXISANGLE_DEGREES: 080

## 2024-11-06 ASSESSMENT — LID EXAM ASSESSMENTS
OS_BLEPHARITIS: LLL LUL 1+
OD_BLEPHARITIS: RLL RUL 1+

## 2024-11-06 ASSESSMENT — REFRACTION_MANIFEST
OS_SPHERE: +1.75
OD_CYLINDER: -1.75
OS_CYLINDER: -2.25
OS_AXIS: 075
OS_VA1: 20/30-1
OD_ADD: +3.00
OD_SPHERE: +3.00
OS_ADD: +3.00
OD_AXIS: 090
OD_VA1: 20/20-2

## 2024-11-06 ASSESSMENT — VISUAL ACUITY
OS_BCVA: 20/40
OD_BCVA: 20/50

## 2025-01-21 ENCOUNTER — APPOINTMENT (OUTPATIENT)
Dept: ORTHOPEDIC SURGERY | Facility: CLINIC | Age: 80
End: 2025-01-21
Payer: MEDICARE

## 2025-01-21 DIAGNOSIS — M54.16 RADICULOPATHY, LUMBAR REGION: ICD-10-CM

## 2025-01-21 PROCEDURE — 72100 X-RAY EXAM L-S SPINE 2/3 VWS: CPT

## 2025-01-21 PROCEDURE — 73600 X-RAY EXAM OF ANKLE: CPT | Mod: LT

## 2025-01-21 PROCEDURE — 99204 OFFICE O/P NEW MOD 45 MIN: CPT

## 2025-01-21 PROCEDURE — 72170 X-RAY EXAM OF PELVIS: CPT

## 2025-01-30 ENCOUNTER — APPOINTMENT (OUTPATIENT)
Dept: NEUROLOGY | Facility: CLINIC | Age: 80
End: 2025-01-30
Payer: MEDICARE

## 2025-01-30 PROCEDURE — 95912 NRV CNDJ TEST 11-12 STUDIES: CPT

## 2025-01-30 PROCEDURE — 95886 MUSC TEST DONE W/N TEST COMP: CPT

## 2025-02-04 ENCOUNTER — APPOINTMENT (OUTPATIENT)
Dept: ORTHOPEDIC SURGERY | Facility: CLINIC | Age: 80
End: 2025-02-04
Payer: MEDICARE

## 2025-02-04 DIAGNOSIS — M54.16 RADICULOPATHY, LUMBAR REGION: ICD-10-CM

## 2025-02-04 DIAGNOSIS — G57.93 UNSPECIFIED MONONEUROPATHY OF BILATERAL LOWER LIMBS: ICD-10-CM

## 2025-02-04 PROCEDURE — 99213 OFFICE O/P EST LOW 20 MIN: CPT

## 2025-02-07 ENCOUNTER — APPOINTMENT (OUTPATIENT)
Dept: MRI IMAGING | Facility: CLINIC | Age: 80
End: 2025-02-07
Payer: MEDICARE

## 2025-02-07 PROCEDURE — 72148 MRI LUMBAR SPINE W/O DYE: CPT

## 2025-02-18 ENCOUNTER — APPOINTMENT (OUTPATIENT)
Dept: ORTHOPEDIC SURGERY | Facility: CLINIC | Age: 80
End: 2025-02-18
Payer: MEDICARE

## 2025-02-18 VITALS — WEIGHT: 153 LBS | HEIGHT: 71 IN | BODY MASS INDEX: 21.42 KG/M2

## 2025-02-18 DIAGNOSIS — M48.061 SPINAL STENOSIS, LUMBAR REGION WITHOUT NEUROGENIC CLAUDICATION: ICD-10-CM

## 2025-02-18 DIAGNOSIS — M54.16 RADICULOPATHY, LUMBAR REGION: ICD-10-CM

## 2025-02-18 PROCEDURE — 99213 OFFICE O/P EST LOW 20 MIN: CPT
